# Patient Record
Sex: FEMALE | Race: WHITE | Employment: FULL TIME | URBAN - METROPOLITAN AREA
[De-identification: names, ages, dates, MRNs, and addresses within clinical notes are randomized per-mention and may not be internally consistent; named-entity substitution may affect disease eponyms.]

---

## 2020-10-21 PROBLEM — I82.409 DEEP VEIN THROMBOSIS (DVT) (HCC): Status: ACTIVE | Noted: 2020-10-21

## 2024-07-09 ENCOUNTER — OFFICE VISIT (OUTPATIENT)
Dept: OCCUPATIONAL MEDICINE | Age: 54
End: 2024-07-09
Attending: NURSE PRACTITIONER

## 2024-12-21 ENCOUNTER — HOSPITAL ENCOUNTER (OUTPATIENT)
Age: 54
Discharge: HOME | End: 2024-12-21
Payer: COMMERCIAL

## 2024-12-21 ENCOUNTER — APPOINTMENT (OUTPATIENT)
Dept: GENERAL RADIOLOGY | Age: 54
End: 2024-12-21
Attending: NURSE PRACTITIONER
Payer: COMMERCIAL

## 2024-12-21 VITALS
HEART RATE: 75 BPM | DIASTOLIC BLOOD PRESSURE: 84 MMHG | TEMPERATURE: 98 F | OXYGEN SATURATION: 100 % | RESPIRATION RATE: 20 BRPM | SYSTOLIC BLOOD PRESSURE: 129 MMHG

## 2024-12-21 DIAGNOSIS — K59.00 CONSTIPATION, UNSPECIFIED CONSTIPATION TYPE: ICD-10-CM

## 2024-12-21 DIAGNOSIS — M54.50 ACUTE MIDLINE LOW BACK PAIN WITHOUT SCIATICA: Primary | ICD-10-CM

## 2024-12-21 PROCEDURE — 72100 X-RAY EXAM L-S SPINE 2/3 VWS: CPT | Performed by: NURSE PRACTITIONER

## 2024-12-21 PROCEDURE — 99203 OFFICE O/P NEW LOW 30 MIN: CPT | Performed by: NURSE PRACTITIONER

## 2024-12-21 RX ORDER — CYCLOBENZAPRINE HCL 10 MG
10 TABLET ORAL EVERY 12 HOURS PRN
Qty: 10 TABLET | Refills: 0 | Status: SHIPPED | OUTPATIENT
Start: 2024-12-21

## 2024-12-21 RX ORDER — LIDOCAINE 50 MG/G
1 PATCH TOPICAL EVERY 24 HOURS
Qty: 5 PATCH | Refills: 0 | Status: SHIPPED | OUTPATIENT
Start: 2024-12-21 | End: 2024-12-26

## 2024-12-21 NOTE — ED INITIAL ASSESSMENT (HPI)
Patient states this morning she was straining while having a bowel movement, heard a crack, and began having low back pain. Patient states thereafter she picked up a package of heavy water bottle. Patient states now she has lower back pain but denies loss of bowel or bladder function, numbness or tingling in legs. Patient denies urinary symptoms. Patient took ibuprofen this morning and is currently also on 4mg of coumadin and is aware she should not take ibuprofen but states the pain was too great.

## 2024-12-21 NOTE — ED PROVIDER NOTES
Patient Seen in: [facility]      History     Chief Complaint   Patient presents with    Back Pain     Stated Complaint: Back issue    Subjective:   Well-appearing 54-year-old female with dysfunctional uterine bleeding, chronic kidney disease stage II, and a history of DVT presents with complaints of lower back pain after straining today morning while having a bowel movement.  Patient communicates that she suffers from constipation.  Patient communicates that she heard a \"crack\" in her lower back while straining.  Patient communicates that she later went to [place], and when she attempted to lift a pack of water, she felt a pull in her lower back.  Patient denies radiation of pain.  Patient denies loss of bowel or bladder function.  Patient denies lower extremity numbness or weakness.  Patient denies urinary symptoms or abdominal pain.  Patient communicates that she took a dose of ibuprofen with little relief.                  Objective:     Past Medical History:    Esophageal reflux    Hyperlipidemia     and ; doesn't know other parameters    Low blood pressure              Past Surgical History:   Procedure Laterality Date    Hernia surgery      Hysterectomy      Laparoscopic cholecystectomy      Tonsillectomy                  Social History     Socioeconomic History    Marital status: Single   Tobacco Use    Smoking status: Former     Current packs/day: 0.00     Types: Cigarettes     Quit date: 10/19/2013     Years since quittin.1    Smokeless tobacco: Never   Substance and Sexual Activity    Alcohol use: Not Currently    Drug use: Not Currently     Social Drivers of Health      Received from [facility]              Review of Systems    Positive for stated complaint: Back issue  Other systems are as noted in HPI.  Constitutional and vital signs reviewed.      All other systems reviewed and negative except as noted above.    Physical Exam     ED Triage Vitals  [12/21/24 1324]   /84   Pulse 75   Resp 20   Temp 97.5 °F (36.4 °C)   Temp src Oral   SpO2 100 %   O2 Device Bag Valve Mask (BVM)       Current Vitals:   Vital Signs  BP: 129/84  Pulse: 75  Resp: 20  Temp: 97.5 °F (36.4 °C)  Temp src: Oral    Oxygen Therapy  SpO2: 100 %  O2 Device: None (Room air)        Physical Exam  VS: Vital signs reviewed. 02 saturation within normal limits for this patient.    General: Patient is awake and alert, oriented to person, place and time. Pt appears non-toxic.     HEENT: Head is normocephalic, atraumatic. Nonicteric sclera, no conjunctival injection. No facial droop or slurred speech. No oral lesions or pallor. Mucous membranes moist.     Neck: Supple. Normal ROM.    Lungs: Good inspiratory effort. No accessory muscle use or tachypnea.    Abdomen: Soft, nontender, non-distended.    Back:     Cervical back: Normal.      Thoracic back: Normal.      Lumbar back: Spasms, tenderness and bony tenderness present. No swelling, edema, deformity, signs of trauma or lacerations. Decreased range of motion. Negative right straight leg raise test and negative left straight leg raise test.     Extremities: No focal swelling or tenderness. Capillary refill noted. The patient's motor strength is 5/5 and symmetric in upper and lower extremities bilaterally     Skin: Warm, dry and normal in color.     Psychiatric: Normal affect, judgement normal, insight normal.     CNS: Moves all 4 extremities. Interacts appropriately. No gait abnormality. Memory normal.        ED Course   Labs Reviewed - No data to display   PROCEDURE: XR LUMBAR SPINE (MIN 2 VIEWS) (CPT=72100)     COMPARISON: None.     INDICATIONS: Low back pain following a lifting type of injury 1 day prior.     TECHNIQUE: Lumbar spine radiographs (2-3 views)          FINDINGS: Combined with conclusion.     Impression  CONCLUSION: No acute fracture or malalignment of the lumbar spine.  Mild spondylosis.  Presumed cholecystectomy clips.  Large  colonic stool burden suggesting constipation.       Dictated by (CST): Sergey Prakash MD on 12/21/2024 at 2:06 PM      Finalized by (CST): Sergey Prakash MD on 12/21/2024 at 2:07 PM    [facility]   Medical Decision Making  Well-appearing.  X-ray lumbar spine shows no acute fracture, or malignment of the lumbar spine.  Mild spondylosis.  Presumed cholecystectomy clips.  Large colonic stool burden suggesting constipation.  I independently reviewed the lumbar spine x-ray.  Patient denies any motor or sensory symptoms/deficits.  Prescription for lidocaine patches and flexeril was sent to pharmacy on file.  I discussed over-the-counter MiraLAX and glycerin suppositories for constipation.  Differential diagnosis considered included osteoarthritis versus strain versus fracture versus compression fracture.  PMD follow-up as well as return precautions discussed.    Problems Addressed:  Acute midline low back pain without sciatica: acute illness or injury  Constipation, unspecified constipation type: acute illness or injury    Amount and/or Complexity of Data Reviewed  Radiology: ordered and independent interpretation performed. Decision-making details documented in ED Course.    Risk  OTC drugs.  Prescription drug management.        Disposition and Plan     Clinical Impression:  1. Acute midline low back pain without sciatica    2. Constipation, unspecified constipation type         Disposition:  Discharge  12/21/2024  2:26 pm    Follow-up:  [facility]  [address] [address] [phone]    In 1 week  As needed          Medications Prescribed:  Discharge Medication List as of 12/21/2024  2:30 PM        START taking these medications    Details   cyclobenzaprine 10 MG Oral Tab Take 1 tablet (10 mg total) by mouth every 12 (twelve) hours as needed for Muscle spasms., Normal, Disp-10 tablet, R-0      lidocaine 5 % External Patch Place 1 patch onto the skin daily for 5 days., Normal,  Disp-5 patch, R-0                 Supplementary Documentation: